# Patient Record
Sex: FEMALE
[De-identification: names, ages, dates, MRNs, and addresses within clinical notes are randomized per-mention and may not be internally consistent; named-entity substitution may affect disease eponyms.]

---
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
## 2018-07-13 ENCOUNTER — HOSPITAL ENCOUNTER (OUTPATIENT)
Dept: HOSPITAL 68 - STS | Age: 28
End: 2018-07-13
Attending: SURGERY
Payer: COMMERCIAL

## 2018-07-13 VITALS — HEIGHT: 67 IN | BODY MASS INDEX: 23.54 KG/M2 | WEIGHT: 150 LBS

## 2018-07-13 DIAGNOSIS — D05.11: Primary | ICD-10-CM

## 2018-07-13 NOTE — OPERATIVE REPORT
Operative/Inv Procedure Report
Surgery Date: 07/13/18
Name of Procedure:
Right breast excisional biopsy
Pre-Operative Diagnosis:
Breast mass
Post-Operative Diagnosis:
Same
Estimated Blood Loss: scant
Surgeon/Assistant:
Marck Xie MD
 
Anesthesia: local monitored anesthesi
Specimens:
Breast tissue
Operative Indication:
Patient presents with a palpable abnormality in the right breast asymmetric to 
the left.  There is no imaging characteristics seen on mammary ultrasound.  She 
presents for excisional biopsy
 
Operative/Procedure Note
Note:
After consent she brought the operating was supine.  Sedation was obtained in 
the right breast was prepped and draped.  Skin overlying the mass was able to 
local anesthesia and incision made through Alea's lines.  We came through this
obtains tissues with cautery and encountered what appeared to be dense glandular
tissue.  There is no discrete mass.  The palpable abnormality was in dissected 
free from surrounding structures with cautery.  It was marked for orientation 
and sent for fresh analysis.  Wound was irrigated with saline.  Incision then 
closed in 2 layers of 3-0 and 4-0 Vicryl sutures.  Steri-Strips and sterile 
dressing applied.  Sponge and needle counts are correct